# Patient Record
Sex: MALE | Race: WHITE | Employment: FULL TIME | URBAN - METROPOLITAN AREA
[De-identification: names, ages, dates, MRNs, and addresses within clinical notes are randomized per-mention and may not be internally consistent; named-entity substitution may affect disease eponyms.]

---

## 2018-10-04 ENCOUNTER — OFFICE VISIT (OUTPATIENT)
Dept: URGENT CARE | Facility: URGENT CARE | Age: 48
End: 2018-10-04
Payer: OTHER MISCELLANEOUS

## 2018-10-04 VITALS
BODY MASS INDEX: 33.23 KG/M2 | WEIGHT: 245 LBS | TEMPERATURE: 98.1 F | DIASTOLIC BLOOD PRESSURE: 88 MMHG | OXYGEN SATURATION: 97 % | HEART RATE: 85 BPM | SYSTOLIC BLOOD PRESSURE: 130 MMHG

## 2018-10-04 DIAGNOSIS — S61.211A LACERATION OF LEFT INDEX FINGER WITHOUT FOREIGN BODY WITHOUT DAMAGE TO NAIL, INITIAL ENCOUNTER: Primary | ICD-10-CM

## 2018-10-04 PROCEDURE — 12001 RPR S/N/AX/GEN/TRNK 2.5CM/<: CPT | Performed by: FAMILY MEDICINE

## 2018-10-04 NOTE — MR AVS SNAPSHOT
"              After Visit Summary   10/4/2018    Vitor Moreno    MRN: [id number]           Patient Information     Date Of Birth          1970        Visit Information        Provider Department      10/4/2018 9:15 AM John Betancourt,  [facility]        Today's Diagnoses     Laceration of left index finger without foreign body without damage to nail, initial encounter    -  1       Follow-ups after your visit        Who to contact     If you have questions or need follow up information about today's clinic visit or your schedule please contact [facility] directly at [phone].  Normal or non-critical lab and imaging results will be communicated to you by OROShart, letter or phone within 4 business days after the clinic has received the results. If you do not hear from us within 7 days, please contact the clinic through OROShart or phone. If you have a critical or abnormal lab result, we will notify you by phone as soon as possible.  Submit refill requests through Yebol or call your pharmacy and they will forward the refill request to us. Please allow 3 business days for your refill to be completed.          Additional Information About Your Visit        MyChart Information     Yebol lets you send messages to your doctor, view your test results, renew your prescriptions, schedule appointments and more. To sign up, go to [email or web address] . Click on \"Log in\" on the left side of the screen, which will take you to the Welcome page. Then click on \"Sign up Now\" on the right side of the page.     You will be asked to enter the access code listed below, as well as some personal information. Please follow the directions to create your username and password.     Your access code is: [email or web address]  Expires: 2019 12:44 PM     Your access code will  in 90 days. If you need help or a new code, please call your [place] clinic or " [phone].        Care EveryWhere ID     This is your Care EveryWhere ID. This could be used by other organizations to access your [place] medical records  [id number]        Your Vitals Were     Pulse Temperature Pulse Oximetry BMI (Body Mass Index)          85 98.1  F (36.7  C) (Oral) 97% 33.23 kg/m2         Blood Pressure from Last 3 Encounters:   10/04/18 130/88   03/06/15 118/82   01/09/15 120/70    Weight from Last 3 Encounters:   10/04/18 245 lb (111.1 kg)   03/06/15 225 lb (102.1 kg)   01/15/15 230 lb (104.3 kg)              We Performed the Following     REPAIR SUPERFICIAL, WOUND BODY < =2.5CM        Primary Care Provider Fax #    Physician No Ref-Primary [phone]       No address on file        Equal Access to Services     ARON OBANDO : Hadii jayjay Kilpatrick, waremedios rodriguez, ap kaalmachase arias, shantel ceron . So [facility] [phone].    ATENCIÓN: Si habla español, tiene a quinones disposición servicios gratuitos de asistencia lingüística. Llame al [phone].    We comply with applicable federal civil rights laws and [place] laws. We do not discriminate on the basis of race, color, national origin, age, disability, sex, sexual orientation, or gender identity.            Thank you!     Thank you for choosing [facility]  for your care. Our goal is always to provide you with excellent care. Hearing back from our patients is one way we can continue to improve our services. Please take a few minutes to complete the written survey that you may receive in the mail after your visit with us. Thank you!             Your Updated Medication List - Protect others around you: Learn how to safely use, store and throw away your medicines at www.disposemymeds.org.          This list is accurate as of 10/4/18 12:44 PM.  Always use your most recent med list.                   Brand Name Dispense Instructions for use Diagnosis    IBUPROFEN PO       Take 800 mg by mouth every 4 hours as needed for moderate pain        oxyCODONE-acetaminophen 5-325 MG per tablet    PERCOCET    30 tablet    Take 1 tablet by mouth every 4 hours as needed for moderate to severe pain    Pain in joint, shoulder region, unspecified laterality       traMADol 50 MG tablet    ULTRAM    40 tablet    Take 1-2 tablets ( mg) by mouth every 6 hours as needed for moderate pain    Pain in joint, shoulder region, unspecified laterality       TYLENOL PO      Take 325 mg by mouth every 4 hours as needed for mild pain or fever

## 2018-10-04 NOTE — PROGRESS NOTES
SUBJECTIVE: @[email or web address]@.ident who presents to the clinic with a laceration on the finger sustained today(s) ago.    This is a non-work related and accidental injury.    Mechanism of injury: knife.  Associated symptoms: Denies numbness, weakness, or loss of function  Last tetanus booster within 10 years: yes    No past medical history on file.  Allergies   Allergen Reactions     Codeine      Vicodin [Acetaminophen]      Social History   Substance Use Topics     Smoking status: Former Smoker     Packs/day: 0.50     Years: 20.00     Types: Cigarettes     Quit date: 1/29/2015     Smokeless tobacco: Current User      Comment: 2 packs/day, now less than 1/2ppd (4/05/07)     Alcohol use Yes      Comment: occ       ROS:  Neuro: good distal sensation  Motor: normal rom and strenght  Hem: capillary refill < 2 sec    EXAM: The patient appears today in alert,no apparent distress distressVITALS:   /88  Pulse 85  Temp 98.1  F (36.7  C) (Oral)  Wt 245 lb (111.1 kg)  SpO2 97%  BMI 33.23 kg/m2  Size of laceration: 2 centimeters  Characteristics of the laceration: clean and straight  Tendon function intact: yes  Sensation to light touch intact: yes  Pulses/Capillary refill intact: yes      ICD-10-CM    1. Laceration of left index finger without foreign body without damage to nail, initial encounter S61.211A REPAIR SUPERFICIAL, WOUND BODY < =2.5CM       Procedure Note:Wound injected with 1 cc's of Lidocaine 1% plain  Good anesthesia was obtainedPrepped and draped in the usual sterile fashion  Wound cleaned with sterile water  Wound soakedLaceration was closed using 4 5-0 nylon interrupted sutures  After care instructions:Keep wound clean   Sutures out in 10 days  Signs of infection discussed today

## 2019-04-21 ENCOUNTER — APPOINTMENT (OUTPATIENT)
Dept: CT IMAGING | Facility: CLINIC | Age: 49
End: 2019-04-21
Attending: EMERGENCY MEDICINE

## 2019-04-21 ENCOUNTER — HOSPITAL ENCOUNTER (EMERGENCY)
Facility: CLINIC | Age: 49
Discharge: HOME | End: 2019-04-22
Attending: EMERGENCY MEDICINE | Admitting: EMERGENCY MEDICINE

## 2019-04-21 DIAGNOSIS — T50.901A ACCIDENTAL DRUG INGESTION, INITIAL ENCOUNTER: ICD-10-CM

## 2019-04-21 LAB
ANION GAP SERPL CALCULATED.3IONS-SCNC: 9 MMOL/L (ref 3–14)
BASOPHILS # BLD AUTO: 0.1 10E9/L (ref 0–0.2)
BASOPHILS NFR BLD AUTO: 0.7 %
BUN SERPL-MCNC: 14 MG/DL (ref 7–30)
CALCIUM SERPL-MCNC: 8.7 MG/DL (ref 8.5–10.1)
CHLORIDE SERPL-SCNC: 105 MMOL/L (ref 94–109)
CO2 SERPL-SCNC: 24 MMOL/L (ref 20–32)
CREAT SERPL-MCNC: 1 MG/DL (ref 0.66–1.25)
DIFFERENTIAL METHOD BLD: ABNORMAL
EOSINOPHIL # BLD AUTO: 0.1 10E9/L (ref 0–0.7)
EOSINOPHIL NFR BLD AUTO: 0.6 %
ERYTHROCYTE [DISTWIDTH] IN BLOOD BY AUTOMATED COUNT: 12 % (ref 10–15)
GFR SERPL CREATININE-BSD FRML MDRD: 90 ML/MIN/{1.73_M2}
GLUCOSE SERPL-MCNC: 123 MG/DL (ref 70–99)
HCT VFR BLD AUTO: 38.5 % (ref 40–53)
HGB BLD-MCNC: 13 G/DL (ref 13.3–17.7)
IMM GRANULOCYTES # BLD: 0.1 10E9/L (ref 0–0.4)
IMM GRANULOCYTES NFR BLD: 0.5 %
LYMPHOCYTES # BLD AUTO: 1 10E9/L (ref 0.8–5.3)
LYMPHOCYTES NFR BLD AUTO: 9.2 %
MCH RBC QN AUTO: 31.3 PG (ref 26.5–33)
MCHC RBC AUTO-ENTMCNC: 33.8 G/DL (ref 31.5–36.5)
MCV RBC AUTO: 93 FL (ref 78–100)
MONOCYTES # BLD AUTO: 0.6 10E9/L (ref 0–1.3)
MONOCYTES NFR BLD AUTO: 5.5 %
NEUTROPHILS # BLD AUTO: 8.7 10E9/L (ref 1.6–8.3)
NEUTROPHILS NFR BLD AUTO: 83.5 %
NRBC # BLD AUTO: 0 10*3/UL
NRBC BLD AUTO-RTO: 0 /100
PLATELET # BLD AUTO: 273 10E9/L (ref 150–450)
POTASSIUM SERPL-SCNC: 4.1 MMOL/L (ref 3.4–5.3)
RBC # BLD AUTO: 4.15 10E12/L (ref 4.4–5.9)
SODIUM SERPL-SCNC: 138 MMOL/L (ref 133–144)
WBC # BLD AUTO: 10.5 10E9/L (ref 4–11)

## 2019-04-21 PROCEDURE — 93005 ELECTROCARDIOGRAM TRACING: CPT

## 2019-04-21 PROCEDURE — 85025 COMPLETE CBC W/AUTO DIFF WBC: CPT | Performed by: EMERGENCY MEDICINE

## 2019-04-21 PROCEDURE — 96360 HYDRATION IV INFUSION INIT: CPT

## 2019-04-21 PROCEDURE — 25000128 H RX IP 250 OP 636: Performed by: EMERGENCY MEDICINE

## 2019-04-21 PROCEDURE — 70450 CT HEAD/BRAIN W/O DYE: CPT

## 2019-04-21 PROCEDURE — 99285 EMERGENCY DEPT VISIT HI MDM: CPT | Mod: 25

## 2019-04-21 PROCEDURE — 80048 BASIC METABOLIC PNL TOTAL CA: CPT | Performed by: EMERGENCY MEDICINE

## 2019-04-21 RX ORDER — SODIUM CHLORIDE 9 MG/ML
1000 INJECTION, SOLUTION INTRAVENOUS CONTINUOUS
Status: DISCONTINUED | OUTPATIENT
Start: 2019-04-21 | End: 2019-04-22 | Stop reason: HOSPADM

## 2019-04-21 RX ADMIN — SODIUM CHLORIDE 1000 ML: 9 INJECTION, SOLUTION INTRAVENOUS at 22:20

## 2019-04-21 ASSESSMENT — ENCOUNTER SYMPTOMS
SLEEP DISTURBANCE: 1
HEADACHES: 0
SEIZURES: 1

## 2019-04-21 NOTE — ED AVS SNAPSHOT
[facility] Emergency Department  [address]  [facility] [address]  Phone:  [phone]  Fax:  [phone]                                    Vitor Moreno   MRN: [id number]    Department:  [facility] Emergency Department   Date of Visit:  4/21/2019           After Visit Summary Signature Page    I have received my discharge instructions, and my questions have been answered. I have discussed any challenges I see with this plan with the nurse or doctor.    ..........................................................................................................................................  Patient/Patient Representative Signature      ..........................................................................................................................................  Patient Representative Print Name and Relationship to Patient    ..................................................               ................................................  Date                                   Time    ..........................................................................................................................................  Reviewed by Signature/Title    ...................................................              ..............................................  Date                                               Time          22EPIC Rev 08/18

## 2019-04-22 VITALS
TEMPERATURE: 99.1 F | DIASTOLIC BLOOD PRESSURE: 80 MMHG | RESPIRATION RATE: 15 BRPM | OXYGEN SATURATION: 95 % | SYSTOLIC BLOOD PRESSURE: 108 MMHG | HEART RATE: 111 BPM

## 2019-04-22 LAB — INTERPRETATION ECG - MUSE: NORMAL

## 2019-04-22 NOTE — ED TRIAGE NOTES
Pt boiled down a few ounces of marijuana tonight and put in brownies. Pt awoke screaming in pain and having full body tremors and hyperventilating. Given IV Versed 1mg by EMS, states breathing and tremors improved. ABC's intact, alert and oriented x3.

## 2019-04-22 NOTE — ED PROVIDER NOTES
History     Chief Complaint:  Ingestion    HPI   Vitor Moreno is a 48 year old male who presents after ingestion via EMS. EMS reports that the patient ingested weed brownies earlier and took a nap. He woke up screaming and tremulous prompting his wife to contact EMS to bring him here. His wife also ate the brownies and had no similar complaints. En route, per EMS, the patient was tensing up his muscles and elma, and did this six times. They administered versed en route and has had only once of these episodes since. He denies alcohol, current illness, medical problems, headaches, current meds.     Allergies:  Codeine  Vicodin    Medications:    The patient is currently on no regular medications.    Past Medical History:    Testicular cancer, in remission    Past Surgical History:    The patient does not have any pertinent past surgical history.    Family History:    No past pertinent family history.    Social History:  Former smoker: 0.5 ppd, 20 years  Current chew  Positive for alcohol use.     Review of Systems   Neurological: Positive for seizures. Negative for headaches.   Psychiatric/Behavioral: Positive for behavioral problems and sleep disturbance.   All other systems reviewed and are negative.      Physical Exam     Patient Vitals for the past 24 hrs:   BP Temp Temp src Pulse Heart Rate Resp SpO2   04/21/19 2330 108/80 -- -- 111 -- -- 95 %   04/21/19 2309 -- -- -- -- 116 15 94 %   04/21/19 2305 126/82 -- -- 121 124 18 96 %   04/21/19 2214 128/82 99.1  F (37.3  C) Oral 123 123 16 92 %         Physical Exam  General: Patient is alert and interactive when I enter the room, laying on bed, in no acute distress  Head:  The scalp, face, and head appear normal  Eyes:  Conjunctivae are injected, PERRL  ENT:    The nose is normal    Pinnae are normal    External acoustic canals are normal  Neck:  Trachea midline  CV:  Pulses are normal    Resp:  No respiratory distress  Abdomen:      Soft, non-tender,  non-distended  Musc:  Normal muscular tone    No major joint effusions    No asymmetric leg swelling  Skin:  No rash or lesions noted  Neuro:  Speech mildly slowed. Face is symmetric.     Moving all extremities well.   Psych: Awake. Alert.  Normal affect.  Appropriate interactions.      Emergency Department Course   ECG:  Indication: ingestion  Time: 2227  Vent. Rate 121 bpm. UT interval 144. QRS duration 96. QT/QTc 324/460. P-R-T axis 57 44 30.  Sinus tachycardia, Otherwise normal ECG. Read time: 2227    Imaging:  Radiographic findings were communicated with the patient who voiced understanding of the findings.    CT Head without contrast:   Normal head CT. as per radiology.    Laboratory:  CBC: WBC: 10.5, HGB: 13.0, PLT: 273  BMP: Glucose 123, o/w WNL (Creatinine: 1.00)    Interventions:    2220 NS 1L IV    Emergency Department Course:  Nursing notes and vitals reviewed. (2216) I performed an exam of the patient as documented above.     IV inserted. Blood drawn. This was sent to the lab for further testing, results above.    The patient was sent for a CT head while in the emergency department, findings above.     (2343) I rechecked the patient and discussed the results of his workup thus far.     Findings and plan explained to the Patient. Patient discharged home with instructions regarding supportive care, medications, and reasons to return. The importance of close follow-up was reviewed.     I personally reviewed the laboratory results with the Patient and answered all related questions prior to discharge.     Impression & Plan      Medical Decision Making:  Vitor Moreno is a 48 year old gentleman who presents after ingestion of marijuana. Patient was quite tachycardic and agitated with tremors (for EMS) requiring versed. However, he arrives here and is quite calm, and his heart rate has already started to come down to 110s to 120s. EKG revealed sinus tachycardia. His blood workup and head CT were normal and  he was neurologically intact after staying here. He reports that he thinks he maybe just had a nightmare and with the marijuana, got agitated. At this point, he wanted to go home, he was ambulatory, looked well, so I think at this point he can go home. Patient was discharged.       Diagnosis:    ICD-10-CM    1. Accidental drug ingestion, initial encounter T50.901A        Disposition:  discharged to home    Scribe Disclosure:  I, Chris Bolden, am serving as a scribe on 4/21/2019 at 10:16 PM to personally document services performed by Jennifer Griggs MD based on my observations and the provider's statements to me.     Chris Bolden  4/21/2019   [facility] EMERGENCY DEPARTMENT       Jennifer Griggs MD  04/23/19 2053

## 2019-04-22 NOTE — ED NOTES
Bed: ED02  Expected date: 4/21/19  Expected time: 9:51 PM  Means of arrival: Ambulance  Comments:  jeff Hartman